# Patient Record
Sex: MALE | Race: BLACK OR AFRICAN AMERICAN | NOT HISPANIC OR LATINO
[De-identification: names, ages, dates, MRNs, and addresses within clinical notes are randomized per-mention and may not be internally consistent; named-entity substitution may affect disease eponyms.]

---

## 2019-08-22 PROBLEM — Z00.00 ENCOUNTER FOR PREVENTIVE HEALTH EXAMINATION: Noted: 2019-08-22

## 2019-09-03 ENCOUNTER — APPOINTMENT (OUTPATIENT)
Dept: UROLOGY | Facility: CLINIC | Age: 64
End: 2019-09-03
Payer: MEDICARE

## 2019-09-03 VITALS — HEART RATE: 63 BPM | SYSTOLIC BLOOD PRESSURE: 151 MMHG | DIASTOLIC BLOOD PRESSURE: 85 MMHG

## 2019-09-03 DIAGNOSIS — Z86.39 PERSONAL HISTORY OF OTHER ENDOCRINE, NUTRITIONAL AND METABOLIC DISEASE: ICD-10-CM

## 2019-09-03 DIAGNOSIS — Z78.9 OTHER SPECIFIED HEALTH STATUS: ICD-10-CM

## 2019-09-03 DIAGNOSIS — Z83.3 FAMILY HISTORY OF DIABETES MELLITUS: ICD-10-CM

## 2019-09-03 DIAGNOSIS — N40.0 BENIGN PROSTATIC HYPERPLASIA WITHOUT LOWER URINARY TRACT SYMPMS: ICD-10-CM

## 2019-09-03 PROCEDURE — 99204 OFFICE O/P NEW MOD 45 MIN: CPT

## 2019-09-03 NOTE — REVIEW OF SYSTEMS
[Heartburn] : heartburn [see HPI] : see HPI [Poor quality erections] : Poor quality erections [Wake up at night to urinate  How many times?  ___] : wakes up to urinate [unfilled] times during the night [Strong urge to urinate] : strong urge to urinate [Strain or push to urinate] : strain or push to urinate [Slow urine stream] : slow urine stream [Interrupted urine stream] : interrupted urine stream [Bladder fullness after urinating] : bladder fullness after urinating [Negative] : Heme/Lymph

## 2019-09-03 NOTE — PHYSICAL EXAM
[General Appearance - Well Developed] : well developed [General Appearance - Well Nourished] : well nourished [Normal Appearance] : normal appearance [Well Groomed] : well groomed [General Appearance - In No Acute Distress] : no acute distress [Edema] : no peripheral edema [Respiration, Rhythm And Depth] : normal respiratory rhythm and effort [Exaggerated Use Of Accessory Muscles For Inspiration] : no accessory muscle use [Abdomen Soft] : soft [Abdomen Tenderness] : non-tender [Costovertebral Angle Tenderness] : no ~M costovertebral angle tenderness [Urethral Meatus] : meatus normal [Urinary Bladder Findings] : the bladder was normal on palpation [Scrotum] : the scrotum was normal [No Prostate Nodules] : no prostate nodules [Testes Mass (___cm)] : there were no testicular masses [Normal Station and Gait] : the gait and station were normal for the patient's age [] : no rash [No Focal Deficits] : no focal deficits [Oriented To Time, Place, And Person] : oriented to person, place, and time [Affect] : the affect was normal [Mood] : the mood was normal [Not Anxious] : not anxious [No Palpable Adenopathy] : no palpable adenopathy

## 2019-09-03 NOTE — ASSESSMENT
[FreeTextEntry1] : We had a long discussion regarding the urinary symptoms and management options. Patient should try to restrict fluids a few hours before bedtime, elevate feet if there is any edema and avoid irritating drinks or foods such as coffee, tea, alcohol, spicy foods, etc. Medical management with alpha blockers (such as Flomax, Rapaflo), 5 alpha reductase inhibitors (such as Finasteride, Avodart), daily Cialis especially if there is erectile dysfunction, anticholinergic medications (such as Ditropan, Vesicare, Toviaz, Oxytrol patches) and Myrbetriq was reviewed. Surgical options discussed included photo-vaporization of the prostate also known as Greenlight laser, microwave thermotherapy, transurethral resection of the prostate (TURP) or suprapubic prostatectomy depending on the size of prostate. We also discussed intra-vesical injection of Botox for overactive bladder symptoms. Risks and benefits of each treatment were discussed included but were not limited to worsening urinary symptoms, incontinence, hematuria, erectile dysfunction, ED, retrograde ejaculation, etc. Also, workup with cystoscopy, post-void residual determination and urodynamics study and their indications were reviewed.\par will start daily cialis \par side effects reviewed

## 2019-09-03 NOTE — HISTORY OF PRESENT ILLNESS
[FreeTextEntry1] : cc bph\par pt is  yo male referred for bph . The patient reports frequency, incomplete emptying, intermittency, straining, urgency and weak stream. . He denies . hematuria and dysuria  The patient states symptoms are of  severity. The symptoms have been present for . He reports a history of no complicating symptoms. He denies flank pain, gross hematuria, kidney stones and recurrent UTI.\par  Prior treatments include tamsulosin . Results of treatment are good but didn’t like retrograde ejaculation\par last psa3\par The is no family history of prostate cancer\par

## 2019-09-04 ENCOUNTER — MEDICATION RENEWAL (OUTPATIENT)
Age: 64
End: 2019-09-04

## 2019-09-04 RX ORDER — TADALAFIL 5 MG/1
5 TABLET ORAL
Qty: 90 | Refills: 1 | Status: ACTIVE | COMMUNITY
Start: 2019-09-03 | End: 1900-01-01

## 2022-04-05 ENCOUNTER — EMERGENCY (EMERGENCY)
Facility: HOSPITAL | Age: 67
LOS: 1 days | Discharge: HOME | End: 2022-04-05
Attending: EMERGENCY MEDICINE | Admitting: EMERGENCY MEDICINE
Payer: MEDICARE

## 2022-04-05 VITALS
TEMPERATURE: 99 F | OXYGEN SATURATION: 97 % | DIASTOLIC BLOOD PRESSURE: 71 MMHG | HEART RATE: 66 BPM | RESPIRATION RATE: 18 BRPM | SYSTOLIC BLOOD PRESSURE: 140 MMHG

## 2022-04-05 VITALS
RESPIRATION RATE: 18 BRPM | HEART RATE: 55 BPM | SYSTOLIC BLOOD PRESSURE: 147 MMHG | DIASTOLIC BLOOD PRESSURE: 84 MMHG | TEMPERATURE: 98 F | OXYGEN SATURATION: 100 %

## 2022-04-05 LAB
ALBUMIN SERPL ELPH-MCNC: 4.4 G/DL — SIGNIFICANT CHANGE UP (ref 3.3–5)
ALP SERPL-CCNC: 66 U/L — SIGNIFICANT CHANGE UP (ref 40–120)
ALT FLD-CCNC: 22 U/L — SIGNIFICANT CHANGE UP (ref 4–41)
ANION GAP SERPL CALC-SCNC: 13 MMOL/L — SIGNIFICANT CHANGE UP (ref 7–14)
AST SERPL-CCNC: 38 U/L — SIGNIFICANT CHANGE UP (ref 4–40)
BASOPHILS # BLD AUTO: 0.02 K/UL — SIGNIFICANT CHANGE UP (ref 0–0.2)
BASOPHILS NFR BLD AUTO: 0.3 % — SIGNIFICANT CHANGE UP (ref 0–2)
BILIRUB SERPL-MCNC: 0.9 MG/DL — SIGNIFICANT CHANGE UP (ref 0.2–1.2)
BUN SERPL-MCNC: 22 MG/DL — SIGNIFICANT CHANGE UP (ref 7–23)
CALCIUM SERPL-MCNC: 8.9 MG/DL — SIGNIFICANT CHANGE UP (ref 8.4–10.5)
CHLORIDE SERPL-SCNC: 106 MMOL/L — SIGNIFICANT CHANGE UP (ref 98–107)
CK SERPL-CCNC: 1360 U/L — HIGH (ref 30–200)
CO2 SERPL-SCNC: 22 MMOL/L — SIGNIFICANT CHANGE UP (ref 22–31)
CREAT SERPL-MCNC: 1.63 MG/DL — HIGH (ref 0.5–1.3)
EGFR: 46 ML/MIN/1.73M2 — LOW
EOSINOPHIL # BLD AUTO: 0.13 K/UL — SIGNIFICANT CHANGE UP (ref 0–0.5)
EOSINOPHIL NFR BLD AUTO: 1.8 % — SIGNIFICANT CHANGE UP (ref 0–6)
GLUCOSE SERPL-MCNC: 84 MG/DL — SIGNIFICANT CHANGE UP (ref 70–99)
HCT VFR BLD CALC: 39.7 % — SIGNIFICANT CHANGE UP (ref 39–50)
HGB BLD-MCNC: 12.6 G/DL — LOW (ref 13–17)
IANC: 3.76 K/UL — SIGNIFICANT CHANGE UP (ref 1.8–7.4)
IMM GRANULOCYTES NFR BLD AUTO: 0.1 % — SIGNIFICANT CHANGE UP (ref 0–1.5)
LYMPHOCYTES # BLD AUTO: 2.56 K/UL — SIGNIFICANT CHANGE UP (ref 1–3.3)
LYMPHOCYTES # BLD AUTO: 34.8 % — SIGNIFICANT CHANGE UP (ref 13–44)
MAGNESIUM SERPL-MCNC: 2.3 MG/DL — SIGNIFICANT CHANGE UP (ref 1.6–2.6)
MCHC RBC-ENTMCNC: 23.9 PG — LOW (ref 27–34)
MCHC RBC-ENTMCNC: 31.7 GM/DL — LOW (ref 32–36)
MCV RBC AUTO: 75.3 FL — LOW (ref 80–100)
MONOCYTES # BLD AUTO: 0.87 K/UL — SIGNIFICANT CHANGE UP (ref 0–0.9)
MONOCYTES NFR BLD AUTO: 11.8 % — SIGNIFICANT CHANGE UP (ref 2–14)
NEUTROPHILS # BLD AUTO: 3.76 K/UL — SIGNIFICANT CHANGE UP (ref 1.8–7.4)
NEUTROPHILS NFR BLD AUTO: 51.2 % — SIGNIFICANT CHANGE UP (ref 43–77)
NRBC # BLD: 0 /100 WBCS — SIGNIFICANT CHANGE UP
NRBC # FLD: 0 K/UL — SIGNIFICANT CHANGE UP
PLATELET # BLD AUTO: 202 K/UL — SIGNIFICANT CHANGE UP (ref 150–400)
POTASSIUM SERPL-MCNC: 4.2 MMOL/L — SIGNIFICANT CHANGE UP (ref 3.5–5.3)
POTASSIUM SERPL-SCNC: 4.2 MMOL/L — SIGNIFICANT CHANGE UP (ref 3.5–5.3)
PROT SERPL-MCNC: 7.4 G/DL — SIGNIFICANT CHANGE UP (ref 6–8.3)
RBC # BLD: 5.27 M/UL — SIGNIFICANT CHANGE UP (ref 4.2–5.8)
RBC # FLD: 15.2 % — HIGH (ref 10.3–14.5)
SODIUM SERPL-SCNC: 141 MMOL/L — SIGNIFICANT CHANGE UP (ref 135–145)
TROPONIN T, HIGH SENSITIVITY RESULT: 10 NG/L — SIGNIFICANT CHANGE UP
TROPONIN T, HIGH SENSITIVITY RESULT: 11 NG/L — SIGNIFICANT CHANGE UP
WBC # BLD: 7.35 K/UL — SIGNIFICANT CHANGE UP (ref 3.8–10.5)
WBC # FLD AUTO: 7.35 K/UL — SIGNIFICANT CHANGE UP (ref 3.8–10.5)

## 2022-04-05 PROCEDURE — 93010 ELECTROCARDIOGRAM REPORT: CPT

## 2022-04-05 PROCEDURE — 99285 EMERGENCY DEPT VISIT HI MDM: CPT | Mod: 25

## 2022-04-05 PROCEDURE — 71046 X-RAY EXAM CHEST 2 VIEWS: CPT | Mod: 26

## 2022-04-05 RX ORDER — SODIUM CHLORIDE 9 MG/ML
1000 INJECTION INTRAMUSCULAR; INTRAVENOUS; SUBCUTANEOUS ONCE
Refills: 0 | Status: COMPLETED | OUTPATIENT
Start: 2022-04-05 | End: 2022-04-05

## 2022-04-05 RX ADMIN — SODIUM CHLORIDE 1000 MILLILITER(S): 9 INJECTION INTRAMUSCULAR; INTRAVENOUS; SUBCUTANEOUS at 06:37

## 2022-04-05 NOTE — ED PROVIDER NOTE - PATIENT PORTAL LINK FT
You can access the FollowMyHealth Patient Portal offered by [facility] by registering at the following website: [email or web address]. By joining [facility]’s FollowMyHealth portal, you will also be able to view your health information using other applications (apps) compatible with our system.

## 2022-04-05 NOTE — ED PROVIDER NOTE - CLINICAL SUMMARY MEDICAL DECISION MAKING FREE TEXT BOX
Joseph Frankel PGY3: 66 yo M with fleeting left arm electrical pain that localized to the left fourth digit, now resolved. VSS. Patient looks well and is non toxic appearing. PE as above. ECG with no ST changes and non specific T wave changes. Story sounds neuropathic in nature if anything. No concern for ACS, dissection, stroke. Unlikely for metabolic derangement to manifest in isolated tip of left fourth finger pain. As patient has good follow up with his doctor and low suspicion for acute pathology, patient stable for DC. Discussed plan and return precautions with patient who understands and agrees. All questions answered. Joseph Frankel PGY3: 68 yo M with fleeting left arm electrical pain that localized to the left fourth digit, now resolved. VSS. Patient looks well and is non toxic appearing. PE as above. ECG with no ST changes and non specific T wave changes. Story sounds neuropathic in nature if anything. Low concern for ACS. No concern for dissection or stroke. Unlikely for metabolic derangement to manifest in isolated tip of left fourth finger pain. As patient has never had this before will get screening labs for ACS vs metabolic derangement and cxr. Will reassess.

## 2022-04-05 NOTE — ED PROVIDER NOTE - PHYSICAL EXAMINATION
GENERAL: Patient lying in bed comfortably. In no acute distress. Answering questions appropriately.   HEENT: NC/AT, PERRL, EOMI b/l, conjunctiva noninjected and anicteric,  moist mucous membranes  NECK: Supple, trachea midline  LUNG: CTAB, no w/r/r appreciated, good respiratory effort  CV: RRR, no m/r/g appreciated, pulses equal and strong in bl extremities  ABDOMEN: Soft, NTND, no rebound or guarding, no appreciable organomegaly  MSK: No edema, no visible deformities, full range of motion UE/LE b/l, 5/5 muscle strength UE/LE b/l  NEURO: AAOx4 (to person, place, time, event), CN II-XII grossly intact, sensation grossly intact, finger-to-nose smooth and rapid, no pronator drift, steady gait,  SKIN: Warm, dry, well perfused, no evidence of rash  PSYCH: Normal mood and affect GENERAL: Patient lying in bed comfortably. In no acute distress. Answering questions appropriately.   HEENT: NC/AT, PERRL, EOMI b/l, conjunctiva noninjected and anicteric,  moist mucous membranes, no intraoral swelling or tooth abscess  NECK: Supple, trachea midline  LUNG: CTAB, no w/r/r appreciated, good respiratory effort  CV: RRR, no m/r/g appreciated, pulses equal and strong in bl extremities  ABDOMEN: Soft, NTND, no rebound or guarding, no appreciable organomegaly  MSK: No edema, no visible deformities, full range of motion UE/LE b/l, 5/5 muscle strength UE/LE b/l  NEURO: AAOx4 (to person, place, time, event), CN II-XII grossly intact, sensation grossly intact, finger-to-nose smooth and rapid, no pronator drift, steady gait,  SKIN: Warm, dry, well perfused, no evidence of rash  PSYCH: Normal mood and affect

## 2022-04-05 NOTE — ED ADULT NURSE NOTE - OBJECTIVE STATEMENT
66 y/o male, a&ox4, ambulatory, received to rm 23 with c/o pain to upper left extremity. Pt reports waking up from his sleep with a painful sensation that traveled down his left arm and stayed in his left middle finger for approximately 30 minutes. Pain resolved on its own, pt denies taking home medication. Skin intact, no signs of deformities, redness, ecchymosis or limited mobility noted to upper left extremity. NSR on cardiac monitor, 12 lead ECG completed. 18G IV placed to left forearm, labs collected and sent off. Respirations are even and unlabored, no signs of respiratory distress.

## 2022-04-05 NOTE — ED PROVIDER NOTE - ATTENDING CONTRIBUTION TO CARE
HPI: 68 yo M with Past Medical History of prediabetes not on meds that is presenting with acute onset left sided neck pain with shooting "electrical shock"/sharp/stabbing pain down left arm which localized in the tip of left fourth digit. Pain in neck and arm lasted seconds to minutes. Pain at tip of left fourth digit lasted about 20 min. Thinks that the finger got red but did not turn white or blue. Associated with dryness of the mouth that lasted minutes to seconds. Just restarted his pushup regimen yesterday and prior to bed had some neck pain so took some ibuprofen. Also just finished a course of clindamycin for a tooth infection however patient states his symptoms of toothache/swelling had resolved. Patient currently symptom free and feels great. Denies FH of early MI, pleuritic chest pain, ho clots, unilateral leg swelling, recent travel, n/v, diaphoresis, coughing, fevers.  EXAM: NAD, speaking full sentences, neck supple with FROM, nontender, no bruits/masses, heart RRR, no M/R/G/JVD, lungs ctab, abd soft nontender, FROM at all 4 ext, CN 2-12 intact,  strength 5/5, fingers normal with normal sensation and normal cap refill.   MDM: pt with PreDM not on meds that gets annual check ups by his PMD, last was Aug 2021 that presents with sudden onset left arm pain radiating to fingers but staying at ring finger lasting longer than all other symptoms as in HPI, initially "12/10" pain now "0.5/10" pain. Took IBU prior to bed (4 hours prior to arrival). benign physical exam. Consider embolism but has no risk factors, consider Gout but also unlikely given duration and location of pain. Consider ACS but had no chest pain, SOB, N/V, diaphoresis during this entire event. His condition likely MSK related given his recent resuming of workout and working around house today. EKG from triage shows NSR without gross ischemic changes. Will obtain labs and CXR and reassess.

## 2022-04-05 NOTE — ED PROVIDER NOTE - NSFOLLOWUPINSTRUCTIONS_ED_ALL_ED_FT
You were seen in the Emergency Department for shooting pain of your left arm.  1) Advance activity as tolerated.    2) Continue all previously prescribed medications as directed.    3) Follow up with your primary care physician in 24-48 hours - take copies of your results.    4) Return to the Emergency Department for worsening or persistent symptoms, and/or ANY NEW OR CONCERNING SYMPTOMS. If you have issues obtaining follow up, please call: [phone] to obtain a doctor or specialist who takes your insurance in your area. You were seen in the Emergency Department for shooting pain of your left arm.  1) Advance activity as tolerated.    2) Continue all previously prescribed medications as directed.    3) Follow up with your primary care physician in 24-48 hours - take copies of your results.    4) Return to the Emergency Department for worsening or persistent symptoms, and/or ANY NEW OR CONCERNING SYMPTOMS. If you have issues obtaining follow up, please call: [phone] to obtain a doctor or specialist who takes your insurance in your area.  5) You will be contacted by our discharge center to help arrange a cardiology appointment, you may also call the number above to scheduled an appointment yourself

## 2022-04-05 NOTE — ED PROVIDER NOTE - OBJECTIVE STATEMENT
66 yo M with pmh of prediabetes presenting with acute onset left sided neck pain with shooting "electrical shock" pain down left arm which localized in the tip of left fourth digit. Pain in neck and arm lasted seconds to minutes. Pain at tip of left fourth digit lasted about 20 min. Thinks that the finger got red but did not turn white or blue. Associated with dryness of the mouth that lasted minutes to seconds. Just restarted his pushup regimen yesterday and prior to bed had some neck pain so took some ibuprofen. Also just finished a course of clindamycin for a tooth infection however patient states his symptoms of toothache/swelling had resolved. Patient currently symptom free and feels great. Denies FH of early MI, pleuritic chest pain, ho clots, unilateral leg swelling, recent travel, n/v, diaphoresis, coughing, fevers.

## 2022-04-05 NOTE — ED PROVIDER NOTE - NSFOLLOWUPCLINICS_GEN_ALL_ED_FT
[facility]  Cardiology  [address] [place] [id number]  Phone: [phone]  Fax:

## 2022-04-05 NOTE — ED PROVIDER NOTE - PROGRESS NOTE DETAILS
RADHA: pt initial trop 11, will repeat. Pt has no complaints at this time. Cr also noted to be 1.63 and EGFR 46. Pt is aware and reports he has had elevated Cr in past, unknown level but was told about this in past. CK slightly elevated which is likely secondary to recent working out. Will hydrate with IVF and reassess. O'Linda DO PGY-2: received sign out on this patient. Lora PGY3: Patient reevaluated and feeling better. Reviewed and discussed results with patient. Discussed importance of follow up and return precautions. Patient agrees with plan.

## 2022-04-05 NOTE — ED ADULT NURSE NOTE - CHIEF COMPLAINT QUOTE
Pt says he had a shock like sensation in his chest which radiated down his left arm and to the left 4th digit which caused severe pain to that finger. It lasted for about four minutes. Denies any SOB or dizziness or nausea or palpitation. Denies any PMH

## 2022-04-05 NOTE — ED PROVIDER NOTE - NS ED ROS FT
Gen: Denies fever, chills, generalized weakness  CV: Denies chest pain, palpitations  HEENT: Denies neck pain, headache, vision changes  Skin: Denies rash, erythema, color changes  Resp: Denies SOB, cough  Endo: Denies sensitivity to heat, cold, increased urination  GI: Denies constipation, nausea, vomiting, diarrhea  Msk: Denies back pain, LE swelling, + left upper extremity pain  : Denies dysuria, increased frequency  Neuro: Denies LOC, focal weakness, numbness, tingling  Psych: Denies hx of psych, SI, HI

## 2023-09-22 ENCOUNTER — APPOINTMENT (OUTPATIENT)
Dept: INFECTIOUS DISEASE | Facility: CLINIC | Age: 68
End: 2023-09-22

## 2024-09-04 ENCOUNTER — APPOINTMENT (OUTPATIENT)
Dept: UROLOGY | Facility: CLINIC | Age: 69
End: 2024-09-04

## 2024-09-06 ENCOUNTER — APPOINTMENT (OUTPATIENT)
Dept: UROLOGY | Facility: CLINIC | Age: 69
End: 2024-09-06

## 2024-09-25 ENCOUNTER — APPOINTMENT (OUTPATIENT)
Dept: UROLOGY | Facility: CLINIC | Age: 69
End: 2024-09-25
Payer: SUBSIDIZED

## 2024-09-25 VITALS
WEIGHT: 205 LBS | HEIGHT: 69 IN | OXYGEN SATURATION: 100 % | BODY MASS INDEX: 30.36 KG/M2 | DIASTOLIC BLOOD PRESSURE: 88 MMHG | SYSTOLIC BLOOD PRESSURE: 153 MMHG | HEART RATE: 61 BPM

## 2024-09-25 PROCEDURE — ZZZZZ: CPT
